# Patient Record
Sex: MALE | Race: WHITE
[De-identification: names, ages, dates, MRNs, and addresses within clinical notes are randomized per-mention and may not be internally consistent; named-entity substitution may affect disease eponyms.]

---

## 2017-02-13 ENCOUNTER — HOSPITAL ENCOUNTER (EMERGENCY)
Dept: HOSPITAL 53 - M ED | Age: 22
Discharge: HOME | End: 2017-02-13
Payer: OTHER GOVERNMENT
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
## 2017-02-13 DIAGNOSIS — S61.012A: Primary | ICD-10-CM
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
## 2017-02-13 NOTE — EDDOCDS
Nurse's Notes                                                                                     

[facility]                                                                         

Name: Bill Crocker                                                                              

Age: 21 yrs                                                                                       

Sex: Male                                                                                         

: 1995                                                                                   

MRN: [id number]                                                                                     

Arrival Date: 2017                                                                          

Time: 19:26                                                                                       

Account#: [id number]                                                                              

Bed PD                                                                                      

Private MD: Other - Complete Info On Cds                                                          

Diagnosis: Laceration without foreign body of left thumb with damage to nail                      

                                                                                                  

Presentation:                                                                                     

                                                                                             

19:38 Presenting complaint: Patient states: using kitchen knife to open a box at approx 1830  jjr 

      causing laceration to left thumb. Adult Sepsis Screening: The patient does not have new     

      or worsening altered mentation. Patient's respiratory rate is less than 22. Systolic        

      blood pressure is greater than 100. Patient has a qSOFA score of 0- Negative Sepsis         

      Screen. Suicide/Homicide risk assessment- the patient denies having any suicidal and/or     

      homicidal ideations and does not present with any other emotional, behavioral or mental     

      health complaints.  Status: The patient is an active duty .           

      Transition of care: patient was not received from another setting of care.                  

19:38 Acuity: IDRIS Level 4                                                                     jjr 

19:38 Method Of Arrival: Walkin/Carried/Asstd                                                 jjr 

                                                                                                  

Triage Assessment:                                                                                

19:40 General: Appears in no apparent distress. Pain: Location: dorsal aspect of proximal     jjr 

      phalanx of left thumb. Pt Declines HIV testing. Musculoskeletal: Capillary refill < 3       

      seconds in left fingers.                                                                    

                                                                                                  

Historical:                                                                                       

- Allergies: no known allergies;                                                                  

- Home Meds:                                                                                      

1. none                                                                                         

- PMHx: none;                                                                                     

- PSHx: none;                                                                                     

- Social history: Smoking status: Patient states was never smoker of tobacco. No                  

barriers to communication noted, The patient speaks fluent English.                             

- Family history: Not pertinent.                                                                  

- : The pt / caregiver states he / she is not on anticoagulants. Home medication list             

is obtained from the patient.                                                                   

- Exposure Risk Screening:: None identified.                                                      

                                                                                                  

                                                                                                  

Screenin:18 Screening information is obtained from the patient. Fall risk: No risks identified.     ko2 

      Assistance ADL's: requires no assistance with activities of daily living. Abuse/DV          

      Screen: The patient / caregiver reports he/she is: not in a situation that causes fear,     

      pain or injury. Nutritional screening: No deficits noted. Advance Directives:               

      Currently, there is no health care proxy. There is no active DNR order. There is no         

      living will. There is no Power of . home support is adequate.                       

                                                                                                  

Assessment:                                                                                       

23:00 General: Appears in no apparent distress, Behavior is cooperative. Neurological: Level  ko2 

      of Consciousness is awake, alert. Respiratory: Airway is patent Respiratory effort is       

      even, unlabored. Musculoskeletal: Range of motion limited in left hand.                     

                                                                                                  

Vital Signs:                                                                                      

19:28  / 77; Pulse 79; Resp 18 S; Temp 97.3(O); Pulse Ox 99% on R/A; Weight 88.45 kg    gr2 

      (R); Height 6 ft. 1 in. (185.42 cm) (R); Pain 4/10;                                         

22:36  / 65; Pulse 64; Resp 16; Temp 96.8(T); Pulse Ox 98% on R/A; Pain 3/10;           rw1 

19:28 Body Mass Index 25.73 (88.45 kg, 185.42 cm)                                             gr2 

                                                                                                  

Vitals:                                                                                           

19:28 Log In Time: 2017 at 19:28.                                                gr2 

                                                                                                  

ED Course:                                                                                        

19:27 Patient visited by Tyler Marino.                                                   gr2 

19:27 Patient moved to Waiting                                                                gr2 

19:28 Other - Complete Info On Cds is Private Physician.                                      gr2 

19:29 Patient visited by Tyler aMrino.                                                   gr2 

19:30 Patient moved to Pre RCE                                                                gr2 

19:39 Triage Initiated                                                                        jjr 

20:41 Patient moved to Radiology                                                              tmb 

20:49 Patient moved to Pre RCE                                                                tmb 

21:56 Bill King PA is PHCP.                                                           mo1 

21:56 Chico Holm DO is Attending Physician.                                               mo1 

21:56 Patient moved to Triage 3                                                               ko2 

22:03 Patient visited by Colleen Solis RN.                                                       ko2 

22:03 Patient moved to PD2 /                                                                ko2 

22:05 Patient visited by Bill King PA.                                                mo1 

22:51 [facility] Payment Agreement was scanned into [employer] and attached to record.               gjb 

23:18 The patient / caregiver is instructed regarding the plan of care and ED course.         ko2 

23:18 No IV's were initiated during this patient's visit. No procedures done that require     ko2 

      assistance.                                                                                 

                                                                                                  

Administered Medications:                                                                         

22:35 Drug: Lidocaine 10 ml [lidocaine 20 mg/mL (2 %) injection solution (10 mL)] {Note: adm. rw1 

      by provider.} Route: Infiltration;                                                          

22:36 Follow up: Response: Pt left department before re-evaluation is appropriate             rw1 

                                                                                                  

                                                                                                  

Order Results:                                                                                    

There are currently no results for this order.                                                    

Outcome:                                                                                          

22:31 Discharge ordered by Provider.                                                          mo1 

23:18 Discharge Assessment: Patient awake, alert and oriented x 3. No cognitive and/or        ko2 

      functional deficits noted. Patient verbalized understanding of disposition                  

      instructions. patient administered narcotics - no. The following High Risk Discharge        

      criteria are identified: None. Discharged to home ambulatory, with significant other.       

      Condition: stable. Discharge instructions given to patient, Instructed on discharge         

      instructions, follow up and referral plans. Demonstrated understanding of instructions,     

      Pt was receptive of discharge instructions/ teaching. No special radiology studies were     

      completed. Property sent home with patient.                                                 

23:19 Patient left the ED.                                                                    ko2 

                                                                                                  

Signatures:                                                                                       

Gustavo Traore,JERRODN                      LPN  rw1                                                  

Geno Marino, RN                    Tyler Baumann Michael, PA PA   mo1                                                  

Rufus Ibarra Kari, RN                           RN   ko2                                                  

Evita Haney                                                  

                                                                                                  

**************************************************************************************************

[facility]

## 2017-02-13 NOTE — EDDOCDS
Physician Documentation                                                                           

[facility]                                                                         

Name: Bill Crocker                                                                              

Age: 21 yrs                                                                                       

Sex: Male                                                                                         

: 1995                                                                                   

MRN: [id number]                                                                                     

Arrival Date: 2017                                                                          

Time: 19:26                                                                                       

Account#: [id number]                                                                              

Bed PD                                                                                      

Private MD: Other - Complete Info On Cds                                                          

Disposition:                                                                                      

17 22:31 Discharged to Home/Self Care. Impression: Laceration without foreign body of       

left thumb with damage to nail.                                                                 

- Condition is Stable.                                                                            

- Discharge Instructions: Laceration Care, Adult.                                                 

                                                                                                  

- Medication Reconciliation, Local Pharmacy Hours form.                                           

- Follow up: Private Physician; When: Call to arrange an appointment; Reason: Recheck             

today's complaints, Continuance of care.                                                        

- Problem is new.                                                                                 

- Symptoms are unchanged.                                                                         

- Notes: sutures removed in 2 wks, do not bend thumb at risk of breaking sutures                  

                                                                                                  

                                                                                                  

Historical:                                                                                       

- Allergies: no known allergies;                                                                  

- Home Meds:                                                                                      

1. none                                                                                         

- PMHx: none;                                                                                     

- PSHx: none;                                                                                     

- Social history: Smoking status: Patient states was never smoker of tobacco. No                  

barriers to communication noted, The patient speaks fluent English.                             

- Family history: Not pertinent.                                                                  

- : The pt / caregiver states he / she is not on anticoagulants. Home medication list             

is obtained from the patient.                                                                   

- Exposure Risk Screening:: None identified.                                                      

                                                                                                  

                                                                                                  

Vital Signs:                                                                                      

                                                                                             

19:28  / 77; Pulse 79; Resp 18 S; Temp 97.3(O); Pulse Ox 99% on R/A; Weight 88.45 kg /  gr2 

      195 lbs (R); Height 6 ft. 1 in. (185.42 cm) (R); Pain 4/10;                                 

22:36  / 65; Pulse 64; Resp 16; Temp 96.8(T); Pulse Ox 98% on R/A; Pain 3/10;           rw1 

19:28 Body Mass Index 25.73 (88.45 kg, 185.42 cm)                                             gr2 

                                                                                                  

Procedures:                                                                                       

22:33 Laceration repair:.                                                                     mo1 

                                                                                                  

Laceration:                                                                                       

22:33 Wound Repair of 3cm ( 1.2in ) full thickness laceration to dorsal aspect of proximal    mo1 

      phalanx of left thumb. Distal neuro/vascular/tendon intact. Anesthesia: Local               

      anesthetic administered with 2 mls of 2% lidocaine. Wound prep: Extensive cleansing         

      with betadine by provider. Skin closed with 5 x 4-0 Prolene using Simple interrupted        

      sutures. Dressed with Bacitracin. Patient tolerated well.                                   

                                                                                                  

MDM:                                                                                              

20:37 Fingers Ordered.                                                                        EDMS

22:06 Lidocaine 20 mg/mL (2 %) 10 ml Infiltration once; to bedside ordered.                   mo1 

22:31 Splint Affected Extremity ordered.                                                      mo1 

22:51 [facility] Payment Agreement was scanned into [employer] and attached to record.               gjb 

22:51 Financial registration complete.                                                        gjb 

                                                                                                  

Administered Medications:                                                                         

22:35 Drug: Lidocaine 10 ml [lidocaine 20 mg/mL (2 %) injection solution (10 mL)] {Note: adm. rw1 

      by provider.} Route: Infiltration;                                                          

22:36 Follow up: Response: Pt left department before re-evaluation is appropriate             rw1 

                                                                                                  

                                                                                                  

Signatures:                                                                                       

Dispatcher [employer]                           EDGeno Jaquez, RN                    RN   Bill Baxter PA PA   mo1                                                  

Colleen Solis RN                           RN   koEvita Leos Robert LPN  rw1                                                  

                                                                                                  

The chart was reviewed and I authenticate all verbal orders and agree with the evaluation and 
treatment provided.Attachments:

22:51 [facility] Payment Agreement                                                                gjb 

                                                                                                  

**************************************************************************************************

MTDD

## 2017-02-16 NOTE — EDDOCDS
Physician Documentation                                                                           

[facility]                                                                         

Name: Bill Crocker                                                                              

Age: 21 yrs                                                                                       

Sex: Male                                                                                         

: 1995                                                                                   

MRN: [id number]                                                                                     

Arrival Date: 2017                                                                          

Time: 19:26                                                                                       

Account#: [id number]                                                                              

Bed PD                                                                                      

Private MD: Other - Complete Info On Cds                                                          

Disposition:                                                                                      

17 22:31 Discharged to Home/Self Care. Impression: Laceration without foreign body of       

left thumb with damage to nail.                                                                 

- Condition is Stable.                                                                            

- Discharge Instructions: Laceration Care, Adult.                                                 

                                                                                                  

- Medication Reconciliation, Local Pharmacy Hours form.                                           

- Follow up: Private Physician; When: Call to arrange an appointment; Reason: Recheck             

today's complaints, Continuance of care.                                                        

- Problem is new.                                                                                 

- Symptoms are unchanged.                                                                         

- Notes: sutures removed in 2 wks, do not bend thumb at risk of breaking sutures                  

                                                                                                  

                                                                                                  

Historical:                                                                                       

- Allergies: no known allergies;                                                                  

- Home Meds:                                                                                      

1. none                                                                                         

- PMHx: none;                                                                                     

- PSHx: none;                                                                                     

- Social history: Smoking status: Patient states was never smoker of tobacco. No                  

barriers to communication noted, The patient speaks fluent English.                             

- Family history: Not pertinent.                                                                  

- : The pt / caregiver states he / she is not on anticoagulants. Home medication list             

is obtained from the patient.                                                                   

- Exposure Risk Screening:: None identified.                                                      

                                                                                                  

                                                                                                  

Vital Signs:                                                                                      

                                                                                             

19:28  / 77; Pulse 79; Resp 18 S; Temp 97.3(O); Pulse Ox 99% on R/A; Weight 88.45 kg /  gr2 

      195 lbs (R); Height 6 ft. 1 in. (185.42 cm) (R); Pain 4/10;                                 

22:36  / 65; Pulse 64; Resp 16; Temp 96.8(T); Pulse Ox 98% on R/A; Pain 3/10;           rw1 

19:28 Body Mass Index 25.73 (88.45 kg, 185.42 cm)                                             gr2 

                                                                                                  

Procedures:                                                                                       

22:33 Laceration repair:.                                                                     mo1 

                                                                                                  

Laceration:                                                                                       

22:33 Wound Repair of 3cm ( 1.2in ) full thickness laceration to dorsal aspect of proximal    mo1 

      phalanx of left thumb. Distal neuro/vascular/tendon intact. Anesthesia: Local               

      anesthetic administered with 2 mls of 2% lidocaine. Wound prep: Extensive cleansing         

      with betadine by provider. Skin closed with 5 x 4-0 Prolene using Simple interrupted        

      sutures. Dressed with Bacitracin. Patient tolerated well.                                   

                                                                                                  

MDM:                                                                                              

20:37 Fingers Ordered.                                                                        EDMS

22:06 Lidocaine 20 mg/mL (2 %) 10 ml Infiltration once; to bedside ordered.                   mo1 

22:31 Splint Affected Extremity ordered.                                                      mo1 

22:51 [facility] Payment Agreement was scanned into [employer] and attached to record.               [facility] [place] 

22:51 Financial registration complete.                                                        [facility] [place] 

                                                                                             

09:35 T-Sheet-- Draft Copy was scanned into [employer] and attached to record.                   gb  

                                                                                                  

Administered Medications:                                                                         

                                                                                             

22:35 Drug: Lidocaine 10 ml [lidocaine 20 mg/mL (2 %) injection solution (10 mL)] {Note: adm. rw1 

      by provider.} Route: Infiltration;                                                          

22:36 Follow up: Response: Pt left department before re-evaluation is appropriate             rw1 

                                                                                                  

                                                                                                  

Signatures:                                                                                       

Dispatcher [employer]                           EDMS                                                 

Kathy Carpio, Geno Chavez, RN                    RN   Bill Baxter PA                    PA   mo1                                                  

Colleen Solis RN                           RN   ko2                                                  

Evita Haney                               gjb                                                  

Gustavo Traore LPN  rw1                                                  

                                                                                                  

The chart was reviewed and I authenticate all verbal orders and agree with the evaluation and 
treatment provided.Attachments:

22:51 [facility] Payment Agreement                                                                [facility] [place] 

                                                                                             

09:35 T-Sheet-- Draft Copy                                                                    gb  

                                                                                                  

**************************************************************************************************



*** Chart Complete ***
MTDD

## 2017-02-16 NOTE — EDDOCDS
Physician Documentation                                                                           

[facility]                                                                         

Name: Bill Crocker                                                                              

Age: 21 yrs                                                                                       

Sex: Male                                                                                         

: 1995                                                                                   

MRN: [id number]                                                                                     

Arrival Date: 2017                                                                          

Time: 19:26                                                                                       

Account#: [id number]                                                                              

Bed PD                                                                                      

Private MD: Other - Complete Info On Cds                                                          

Disposition:                                                                                      

17 22:31 Discharged to Home/Self Care. Impression: Laceration without foreign body of       

left thumb with damage to nail.                                                                 

- Condition is Stable.                                                                            

- Discharge Instructions: Laceration Care, Adult.                                                 

                                                                                                  

- Medication Reconciliation, Local Pharmacy Hours form.                                           

- Follow up: Private Physician; When: Call to arrange an appointment; Reason: Recheck             

today's complaints, Continuance of care.                                                        

- Problem is new.                                                                                 

- Symptoms are unchanged.                                                                         

- Notes: sutures removed in 2 wks, do not bend thumb at risk of breaking sutures                  

                                                                                                  

                                                                                                  

Historical:                                                                                       

- Allergies: no known allergies;                                                                  

- Home Meds:                                                                                      

1. none                                                                                         

- PMHx: none;                                                                                     

- PSHx: none;                                                                                     

- Social history: Smoking status: Patient states was never smoker of tobacco. No                  

barriers to communication noted, The patient speaks fluent English.                             

- Family history: Not pertinent.                                                                  

- : The pt / caregiver states he / she is not on anticoagulants. Home medication list             

is obtained from the patient.                                                                   

- Exposure Risk Screening:: None identified.                                                      

                                                                                                  

                                                                                                  

Vital Signs:                                                                                      

                                                                                             

19:28  / 77; Pulse 79; Resp 18 S; Temp 97.3(O); Pulse Ox 99% on R/A; Weight 88.45 kg /  gr2 

      195 lbs (R); Height 6 ft. 1 in. (185.42 cm) (R); Pain 4/10;                                 

22:36  / 65; Pulse 64; Resp 16; Temp 96.8(T); Pulse Ox 98% on R/A; Pain 3/10;           rw1 

19:28 Body Mass Index 25.73 (88.45 kg, 185.42 cm)                                             gr2 

                                                                                                  

Procedures:                                                                                       

22:33 Laceration repair:.                                                                     mo1 

                                                                                                  

Laceration:                                                                                       

22:33 Wound Repair of 3cm ( 1.2in ) full thickness laceration to dorsal aspect of proximal    mo1 

      phalanx of left thumb. Distal neuro/vascular/tendon intact. Anesthesia: Local               

      anesthetic administered with 2 mls of 2% lidocaine. Wound prep: Extensive cleansing         

      with betadine by provider. Skin closed with 5 x 4-0 Prolene using Simple interrupted        

      sutures. Dressed with Bacitracin. Patient tolerated well.                                   

                                                                                                  

MDM:                                                                                              

20:37 Fingers Ordered.                                                                        EDMS

22:06 Lidocaine 20 mg/mL (2 %) 10 ml Infiltration once; to bedside ordered.                   mo1 

22:31 Splint Affected Extremity ordered.                                                      mo1 

22:51 [facility] Payment Agreement was scanned into [employer] and attached to record.               [facility] 

22:51 Financial registration complete.                                                        [facility] 

                                                                                             

09:35 T-Sheet-- Draft Copy was scanned into [employer] and attached to record.                   gb  

                                                                                                  

Administered Medications:                                                                         

                                                                                             

22:35 Drug: Lidocaine 10 ml [lidocaine 20 mg/mL (2 %) injection solution (10 mL)] {Note: adm. rw1 

      by provider.} Route: Infiltration;                                                          

22:36 Follow up: Response: Pt left department before re-evaluation is appropriate             rw1 

                                                                                                  

                                                                                                  

Signatures:                                                                                       

Dispatcher [employer]                           EDMS                                                 

Kathy Carpio, Geno Chavez, RN                    RN   Bill Baxter PA                    PA   mo1                                                  

Colleen Solis RN                           RN   ko2                                                  

Evita Haney                               gjb                                                  

Gustavo Traore LPN  rw1                                                  

                                                                                                  

The chart was reviewed and I authenticate all verbal orders and agree with the evaluation and 
treatment provided.Attachments:

22:51 [facility] Payment Agreement                                                                [facility] 

                                                                                             

09:35 T-Sheet-- Draft Copy                                                                    gb  

                                                                                                  

**************************************************************************************************



*** Chart Complete ***
MTDD

## 2017-02-16 NOTE — EDDOCDS
Nurse's Notes                                                                                     

[facility]                                                                         

Name: Bill Crocker                                                                              

Age: 21 yrs                                                                                       

Sex: Male                                                                                         

: 1995                                                                                   

MRN: [id number]                                                                                     

Arrival Date: 2017                                                                          

Time: 19:26                                                                                       

Account#: [id number]                                                                              

Bed PD                                                                                      

Private MD: Other - Complete Info On Cds                                                          

Diagnosis: Laceration without foreign body of left thumb with damage to nail                      

                                                                                                  

Presentation:                                                                                     

                                                                                             

19:38 Presenting complaint: Patient states: using kitchen knife to open a box at approx 1830  jjr 

      causing laceration to left thumb. Adult Sepsis Screening: The patient does not have new     

      or worsening altered mentation. Patient's respiratory rate is less than 22. Systolic        

      blood pressure is greater than 100. Patient has a qSOFA score of 0- Negative Sepsis         

      Screen. Suicide/Homicide risk assessment- the patient denies having any suicidal and/or     

      homicidal ideations and does not present with any other emotional, behavioral or mental     

      health complaints.  Status: The patient is an active duty .           

      Transition of care: patient was not received from another setting of care.                  

19:38 Acuity: IDRIS Level 4                                                                     jjr 

19:38 Method Of Arrival: Walkin/Carried/Asstd                                                 jjr 

                                                                                                  

Triage Assessment:                                                                                

19:40 General: Appears in no apparent distress. Pain: Location: dorsal aspect of proximal     jjr 

      phalanx of left thumb. Pt Declines HIV testing. Musculoskeletal: Capillary refill < 3       

      seconds in left fingers.                                                                    

                                                                                                  

Historical:                                                                                       

- Allergies: no known allergies;                                                                  

- Home Meds:                                                                                      

1. none                                                                                         

- PMHx: none;                                                                                     

- PSHx: none;                                                                                     

- Social history: Smoking status: Patient states was never smoker of tobacco. No                  

barriers to communication noted, The patient speaks fluent English.                             

- Family history: Not pertinent.                                                                  

- : The pt / caregiver states he / she is not on anticoagulants. Home medication list             

is obtained from the patient.                                                                   

- Exposure Risk Screening:: None identified.                                                      

                                                                                                  

                                                                                                  

Screenin:18 Screening information is obtained from the patient. Fall risk: No risks identified.     ko2 

      Assistance ADL's: requires no assistance with activities of daily living. Abuse/DV          

      Screen: The patient / caregiver reports he/she is: not in a situation that causes fear,     

      pain or injury. Nutritional screening: No deficits noted. Advance Directives:               

      Currently, there is no health care proxy. There is no active DNR order. There is no         

      living will. There is no Power of . home support is adequate.                       

                                                                                                  

Assessment:                                                                                       

23:00 General: Appears in no apparent distress, Behavior is cooperative. Neurological: Level  ko2 

      of Consciousness is awake, alert. Respiratory: Airway is patent Respiratory effort is       

      even, unlabored. Musculoskeletal: Range of motion limited in left hand.                     

                                                                                                  

Vital Signs:                                                                                      

19:28  / 77; Pulse 79; Resp 18 S; Temp 97.3(O); Pulse Ox 99% on R/A; Weight 88.45 kg    gr2 

      (R); Height 6 ft. 1 in. (185.42 cm) (R); Pain 4/10;                                         

22:36  / 65; Pulse 64; Resp 16; Temp 96.8(T); Pulse Ox 98% on R/A; Pain 3/10;           rw1 

19:28 Body Mass Index 25.73 (88.45 kg, 185.42 cm)                                             gr2 

                                                                                                  

Vitals:                                                                                           

19:28 Log In Time: 2017 at 19:28.                                                gr2 

                                                                                                  

ED Course:                                                                                        

19:27 Patient visited by Tyler Marino.                                                   gr2 

19:27 Patient moved to Waiting                                                                gr2 

19:28 Other - Complete Info On Cds is Private Physician.                                      gr2 

19:29 Patient visited by Tyler Marino.                                                   gr2 

19:30 Patient moved to Pre RCE                                                                gr2 

19:39 Triage Initiated                                                                        jjr 

20:41 Patient moved to Radiology                                                              tmb 

20:49 Patient moved to Pre RCE                                                                tmb 

21:56 Bill King PA is PHCP.                                                           mo1 

21:56 Chico Holm DO is Attending Physician.                                               mo1 

21:56 Patient moved to Triage 3                                                               ko2 

22:03 Patient visited by Colleen Solis RN.                                                       ko2 

22:03 Patient moved to PD2 /                                                                ko2 

22:05 Patient visited by Bill King PA.                                                mo1 

22:51 [facility] Payment Agreement was scanned into [employer] and attached to record.               gjb 

23:18 The patient / caregiver is instructed regarding the plan of care and ED course.         ko2 

23:18 No IV's were initiated during this patient's visit. No procedures done that require     ko2 

      assistance.                                                                                 

                                                                                             

08:06 Fingers Returned.                                                                       EDMS

09:35 T-Sheet-- Draft Copy was scanned into [employer] and attached to record.                   gb  

                                                                                                  

Administered Medications:                                                                         

                                                                                             

22:35 Drug: Lidocaine 10 ml [lidocaine 20 mg/mL (2 %) injection solution (10 mL)] {Note: adm. rw1 

      by provider.} Route: Infiltration;                                                          

22:36 Follow up: Response: Pt left department before re-evaluation is appropriate             rw1 

                                                                                                  

                                                                                                  

Order Results:                                                                                    

                                                                                                  

Radiology Order: Fingers                                                                          

      Test: Fingers                                                                               

      REASON FOR EXAMINATION: Trauma; Clinical: Trauma.; ; Technique: AP, lateral, bilateral      

      oblique views of the left first digit.; ; Findings:; No obvious acute fracture or           

      dislocation. No subcutaneous emphysema or; radiodense foreign body.; ; Impression:; No      

      obvious acute fracture or dislocation.; ; ; Signed by; Ronaldo Riggins MD 2017         

      07:38 A;                                                                                    

Outcome:                                                                                          

22:31 Discharge ordered by Provider.                                                          mo1 

23:18 Discharge Assessment: Patient awake, alert and oriented x 3. No cognitive and/or        ko2 

      functional deficits noted. Patient verbalized understanding of disposition                  

      instructions. patient administered narcotics - no. The following High Risk Discharge        

      criteria are identified: None. Discharged to home ambulatory, with significant other.       

      Condition: stable. Discharge instructions given to patient, Instructed on discharge         

      instructions, follow up and referral plans. Demonstrated understanding of instructions,     

      Pt was receptive of discharge instructions/ teaching. No special radiology studies were     

      completed. Property sent home with patient.                                                 

23:19 Patient left the ED.                                                                    ko2 

                                                                                                  

Signatures:                                                                                       

Dispatcher [employer]                           EDMS                                                 

Kathy Carpio, Reg                  Reg  gb                                                   

Gustavo Traore,JERRODN                      LPN  rw1                                                  

Geno Marino, RN                    RN   Tyler Frias                            gr2                                                  

Bill King PA                    PA   mo1                                                  

Rufus Ibarra Kari, RN                           RN   ko2                                                  

Evita Haney                                                  

                                                                                                  

**************************************************************************************************



*** Chart Complete ***
MTDD

## 2019-03-13 ENCOUNTER — HOSPITAL ENCOUNTER (OUTPATIENT)
Dept: HOSPITAL 53 - M RAD | Age: 24
End: 2019-03-13
Attending: FAMILY MEDICINE
Payer: COMMERCIAL

## 2019-03-13 DIAGNOSIS — R22.2: Primary | ICD-10-CM

## 2019-03-13 NOTE — REP
Clinical:  Abdominal pain.  Hernia.

 

Technique:  Axial noncontrast images from the lung bases to the pubic symphysis

with coronal and sagittal re-formations.

 

Findings:

Lung bases are clear.

Liver, spleen, pancreas, gallbladder, bilateral adrenal glands and kidneys are

normal.  The enteric system is without obstruction or acute inflammatory process.

Normal terminal ileum and appendix are identified in the right lower quadrant.

Pelvis demonstrates normal bladder and age appropriate prostate/seminal vesicles.

6 mm fat containing periumbilical hernia cannot be excluded.  No inguinal hernia

identified.  Abdominal aorta without aneurysm.  Musculoskeletal structures are

intact.

 

Impression:

1.  Very small subcentimeter fat containing periumbilical hernia cannot be

excluded.  No further hernias are identified.

2.  No further acute abdominopelvic pathology appreciated.

 

 

Electronically Signed by

Ronaldo Riggins MD 03/13/2019 07:55 P